# Patient Record
Sex: FEMALE | Race: WHITE | NOT HISPANIC OR LATINO | Employment: OTHER | URBAN - METROPOLITAN AREA
[De-identification: names, ages, dates, MRNs, and addresses within clinical notes are randomized per-mention and may not be internally consistent; named-entity substitution may affect disease eponyms.]

---

## 2017-03-13 ENCOUNTER — SEE NOTE (OUTPATIENT)
Dept: URBAN - METROPOLITAN AREA CLINIC 32 | Facility: CLINIC | Age: 42
Setting detail: DERMATOLOGY
End: 2017-03-13

## 2017-03-13 ENCOUNTER — RX ONLY (RX ONLY)
Age: 42
End: 2017-03-13

## 2017-03-13 PROCEDURE — 99213 OFFICE O/P EST LOW 20 MIN: CPT

## 2017-03-13 RX ORDER — FLUOCINOLONE ACETONIDE, HYDROQUINONE, AND TRETINOIN .1; 40; .5 MG/G; MG/G; MG/G
1 APPLICATION CREAM TOPICAL QHS
Qty: 30 | Refills: 3 | Status: DISCONTINUED
Start: 2017-03-13 | End: 2018-01-30

## 2017-03-20 ENCOUNTER — *BOTOX/DYSPORT (OUTPATIENT)
Dept: URBAN - METROPOLITAN AREA CLINIC 32 | Facility: CLINIC | Age: 42
Setting detail: DERMATOLOGY
End: 2017-03-20

## 2017-03-20 PROCEDURE — OTHER BOTOX COSMETIC - 1 AREA *BD*: OTHER

## 2017-03-20 PROCEDURE — OTHER BOTOX VIAL # (100U): OTHER

## 2017-04-10 ENCOUNTER — *BOTOX/DYSPORT (OUTPATIENT)
Dept: URBAN - METROPOLITAN AREA CLINIC 32 | Facility: CLINIC | Age: 42
Setting detail: DERMATOLOGY
End: 2017-04-10

## 2017-04-10 PROCEDURE — OTHER BOTOX COSMETIC - TOUCH-UP: OTHER

## 2017-07-10 ENCOUNTER — *BOTOX/DYSPORT (OUTPATIENT)
Dept: URBAN - METROPOLITAN AREA CLINIC 32 | Facility: CLINIC | Age: 42
Setting detail: DERMATOLOGY
End: 2017-07-10

## 2017-07-10 PROCEDURE — OTHER BOTOX VIAL # (100U): OTHER

## 2017-07-10 PROCEDURE — OTHER BOTOX COSMETIC - 1 AREA *BD*: OTHER

## 2017-07-25 ENCOUNTER — *BOTOX/DYSPORT (OUTPATIENT)
Dept: URBAN - METROPOLITAN AREA CLINIC 32 | Facility: CLINIC | Age: 42
Setting detail: DERMATOLOGY
End: 2017-07-25

## 2017-07-25 PROBLEM — L72.0 EPIDERMAL CYST: Status: RESOLVED | Noted: 2017-07-25

## 2017-07-25 PROBLEM — L72.0 EPIDERMAL CYST: Status: ACTIVE | Noted: 2017-07-25

## 2017-07-25 PROCEDURE — 99213 OFFICE O/P EST LOW 20 MIN: CPT

## 2017-10-17 ENCOUNTER — *BOTOX/DYSPORT (OUTPATIENT)
Dept: URBAN - METROPOLITAN AREA CLINIC 32 | Facility: CLINIC | Age: 42
Setting detail: DERMATOLOGY
End: 2017-10-17

## 2017-10-17 PROCEDURE — OTHER BOTOX COSMETIC - 1 AREA *BD*: OTHER

## 2017-10-17 PROCEDURE — OTHER BOTOX VIAL # (100U): OTHER

## 2018-01-09 ENCOUNTER — RASH (OUTPATIENT)
Dept: URBAN - METROPOLITAN AREA CLINIC 32 | Facility: CLINIC | Age: 43
Setting detail: DERMATOLOGY
End: 2018-01-09

## 2018-01-09 PROCEDURE — 99213 OFFICE O/P EST LOW 20 MIN: CPT

## 2018-01-30 ENCOUNTER — SKIN CANCER EXAM (OUTPATIENT)
Dept: URBAN - METROPOLITAN AREA CLINIC 32 | Facility: CLINIC | Age: 43
Setting detail: DERMATOLOGY
End: 2018-01-30

## 2018-01-30 PROCEDURE — 99214 OFFICE O/P EST MOD 30 MIN: CPT

## 2018-01-30 PROCEDURE — OTHER BOTOX VIAL # (100U): OTHER

## 2018-01-30 PROCEDURE — OTHER BOTOX COSMETIC - 1 AREA *BD*: OTHER

## 2018-04-23 ENCOUNTER — *BOTOX/DYSPORT (OUTPATIENT)
Dept: URBAN - METROPOLITAN AREA CLINIC 32 | Facility: CLINIC | Age: 43
Setting detail: DERMATOLOGY
End: 2018-04-23

## 2018-04-23 PROCEDURE — OTHER BOTOX VIAL # (100U): OTHER

## 2018-04-23 PROCEDURE — OTHER BOTOX COSMETIC - 1 AREA *BD*: OTHER

## 2018-05-08 ENCOUNTER — *BOTOX/DYSPORT TOUCH-UP (OUTPATIENT)
Dept: URBAN - METROPOLITAN AREA CLINIC 32 | Facility: CLINIC | Age: 43
Setting detail: DERMATOLOGY
End: 2018-05-08

## 2018-05-08 PROCEDURE — OTHER NO CHARGE OFFICE VISIT: OTHER

## 2018-08-13 ENCOUNTER — *BOTOX/DYSPORT (OUTPATIENT)
Dept: URBAN - METROPOLITAN AREA CLINIC 32 | Facility: CLINIC | Age: 43
Setting detail: DERMATOLOGY
End: 2018-08-13

## 2018-08-13 PROCEDURE — OTHER BOTOX VIAL # (100U): OTHER

## 2018-08-13 PROCEDURE — OTHER BOTOX COSMETIC - 1 AREA *BD*: OTHER

## 2018-10-08 ENCOUNTER — SEE NOTE (OUTPATIENT)
Dept: URBAN - METROPOLITAN AREA CLINIC 32 | Facility: CLINIC | Age: 43
Setting detail: DERMATOLOGY
End: 2018-10-08

## 2018-10-08 PROBLEM — L82.0 INFLAMED SEBORRHEIC KERATOSIS: Status: RESOLVED | Noted: 2018-10-08

## 2018-10-08 PROBLEM — L82.0 INFLAMED SEBORRHEIC KERATOSIS: Status: ACTIVE | Noted: 2018-10-08

## 2018-10-08 PROCEDURE — 17110 DESTRUCT B9 LESION 1-14: CPT

## 2018-10-08 PROCEDURE — 99213 OFFICE O/P EST LOW 20 MIN: CPT

## 2018-11-01 ENCOUNTER — RX ONLY (RX ONLY)
Age: 43
End: 2018-11-01

## 2018-11-01 RX ORDER — VALACYCLOVIR HYDROCHLORIDE 1 G/1
1 TABLET TABLET, FILM COATED ORAL AS DIRECTED
Qty: 28 | Refills: 0 | Status: DISCONTINUED
Start: 2018-11-01 | End: 2018-11-27

## 2018-11-19 ENCOUNTER — IPL - FACE (BH) (OUTPATIENT)
Dept: URBAN - METROPOLITAN AREA CLINIC 31 | Facility: CLINIC | Age: 43
Setting detail: DERMATOLOGY
End: 2018-11-19

## 2018-11-19 PROCEDURE — OTHER IPL LASER: OTHER

## 2018-11-26 ENCOUNTER — *BOTOX/DYSPORT (OUTPATIENT)
Dept: URBAN - METROPOLITAN AREA CLINIC 32 | Facility: CLINIC | Age: 43
Setting detail: DERMATOLOGY
End: 2018-11-26

## 2018-11-26 PROCEDURE — OTHER DYSPORT VIAL #: OTHER

## 2018-11-26 PROCEDURE — OTHER DYSPORT - 1 AREA: OTHER

## 2018-11-27 ENCOUNTER — RX ONLY (RX ONLY)
Age: 43
End: 2018-11-27

## 2018-11-27 RX ORDER — VALACYCLOVIR HYDROCHLORIDE 1 G/1
1 TABLET TABLET, FILM COATED ORAL AS DIRECTED
Qty: 28 | Refills: 0
Start: 2018-11-27

## 2018-12-20 ENCOUNTER — IPL - FACE (BH) (OUTPATIENT)
Dept: URBAN - METROPOLITAN AREA CLINIC 31 | Facility: CLINIC | Age: 43
Setting detail: DERMATOLOGY
End: 2018-12-20

## 2018-12-20 PROCEDURE — OTHER IPL LASER: OTHER

## 2019-01-22 ENCOUNTER — IPL - FACE (BH) (OUTPATIENT)
Dept: URBAN - METROPOLITAN AREA CLINIC 31 | Facility: CLINIC | Age: 44
Setting detail: DERMATOLOGY
End: 2019-01-22

## 2019-01-22 PROCEDURE — OTHER IPL LASER: OTHER

## 2019-02-11 ENCOUNTER — NUMB FOR INJECTABLES (OUTPATIENT)
Dept: URBAN - METROPOLITAN AREA CLINIC 32 | Facility: CLINIC | Age: 44
Setting detail: DERMATOLOGY
End: 2019-02-11

## 2019-02-11 DIAGNOSIS — L85.3 XEROSIS CUTIS: ICD-10-CM

## 2019-02-11 PROCEDURE — OTHER DYSPORT - 1 AREA: OTHER

## 2019-02-11 PROCEDURE — OTHER DYSPORT VIAL #: OTHER

## 2019-02-25 ENCOUNTER — PEEL- EST (OUTPATIENT)
Dept: URBAN - METROPOLITAN AREA CLINIC 31 | Facility: CLINIC | Age: 44
Setting detail: DERMATOLOGY
End: 2019-02-25

## 2019-02-25 DIAGNOSIS — Z41.9 ENCOUNTER FOR PROCEDURE FOR PURPOSES OTHER THAN REMEDYING HEALTH STATE, UNSPECIFIED: ICD-10-CM

## 2019-02-25 PROCEDURE — OTHER PEEL PCA CUSTOM PEEL: OTHER

## 2019-03-06 ENCOUNTER — CYST INJECTION (OUTPATIENT)
Dept: URBAN - METROPOLITAN AREA CLINIC 32 | Facility: CLINIC | Age: 44
Setting detail: DERMATOLOGY
End: 2019-03-06

## 2019-03-06 DIAGNOSIS — Z41.9 ENCOUNTER FOR PROCEDURE FOR PURPOSES OTHER THAN REMEDYING HEALTH STATE, UNSPECIFIED: ICD-10-CM

## 2019-03-06 PROCEDURE — 11900 INJECT SKIN LESIONS </W 7: CPT

## 2019-03-18 ENCOUNTER — INJECTION OTHER (OUTPATIENT)
Dept: URBAN - METROPOLITAN AREA CLINIC 34 | Facility: CLINIC | Age: 44
Setting detail: DERMATOLOGY
End: 2019-03-18

## 2019-03-18 PROCEDURE — 11900 INJECT SKIN LESIONS </W 7: CPT

## 2019-05-06 ENCOUNTER — SKIN CANCER EXAM (OUTPATIENT)
Dept: URBAN - METROPOLITAN AREA CLINIC 32 | Facility: CLINIC | Age: 44
Setting detail: DERMATOLOGY
End: 2019-05-06

## 2019-05-06 ENCOUNTER — RX ONLY (RX ONLY)
Age: 44
End: 2019-05-06

## 2019-05-06 DIAGNOSIS — L82.0 INFLAMED SEBORRHEIC KERATOSIS: ICD-10-CM

## 2019-05-06 PROCEDURE — 99214 OFFICE O/P EST MOD 30 MIN: CPT

## 2019-05-06 PROCEDURE — OTHER DYSPORT VIAL #: OTHER

## 2019-05-06 PROCEDURE — OTHER DYSPORT - 1 AREA: OTHER

## 2019-05-06 RX ORDER — FLUOCINOLONE ACETONIDE, HYDROQUINONE, AND TRETINOIN .1; 40; .5 MG/G; MG/G; MG/G
1 APPLICATION CREAM TOPICAL QHS
Qty: 30 | Refills: 1
Start: 2019-05-06

## 2019-05-10 ENCOUNTER — SEE NOTE (OUTPATIENT)
Dept: URBAN - METROPOLITAN AREA CLINIC 32 | Facility: CLINIC | Age: 44
Setting detail: DERMATOLOGY
End: 2019-05-10

## 2019-05-10 DIAGNOSIS — Z41.9 ENCOUNTER FOR PROCEDURE FOR PURPOSES OTHER THAN REMEDYING HEALTH STATE, UNSPECIFIED: ICD-10-CM

## 2019-05-10 PROBLEM — L0391 682.9: Status: ACTIVE | Noted: 2019-05-10

## 2019-05-10 PROBLEM — L0390 682.9: Status: ACTIVE | Noted: 2019-05-10

## 2019-05-10 PROCEDURE — 10060 I&D ABSCESS SIMPLE/SINGLE: CPT

## 2019-07-31 ENCOUNTER — NUMB FOR INJECTABLES (OUTPATIENT)
Dept: URBAN - METROPOLITAN AREA CLINIC 32 | Facility: CLINIC | Age: 44
Setting detail: DERMATOLOGY
End: 2019-07-31

## 2019-07-31 DIAGNOSIS — D22.5 MELANOCYTIC NEVI OF TRUNK: ICD-10-CM

## 2019-07-31 PROCEDURE — OTHER BOTOX COSMETIC - 1 AREA *BD*: OTHER

## 2019-07-31 PROCEDURE — OTHER BOTOX VIAL # (100U): OTHER

## 2019-11-11 ENCOUNTER — *BOTOX/DYSPORT (OUTPATIENT)
Dept: URBAN - METROPOLITAN AREA CLINIC 32 | Facility: CLINIC | Age: 44
Setting detail: DERMATOLOGY
End: 2019-11-11

## 2019-11-11 DIAGNOSIS — B07.8 OTHER VIRAL WARTS: ICD-10-CM

## 2019-11-11 PROCEDURE — OTHER DYSPORT - 1 AREA: OTHER

## 2019-11-11 PROCEDURE — OTHER DYSPORT VIAL #: OTHER

## 2020-02-04 ENCOUNTER — *BOTOX/DYSPORT (OUTPATIENT)
Dept: URBAN - METROPOLITAN AREA CLINIC 32 | Facility: CLINIC | Age: 45
Setting detail: DERMATOLOGY
End: 2020-02-04

## 2020-02-04 DIAGNOSIS — L70.0 ACNE VULGARIS: ICD-10-CM

## 2020-02-04 PROCEDURE — OTHER DYSPORT - 1 AREA: OTHER

## 2020-02-04 PROCEDURE — OTHER DYSPORT VIAL #: OTHER

## 2020-05-18 ENCOUNTER — *BOTOX/DYSPORT (OUTPATIENT)
Dept: URBAN - METROPOLITAN AREA CLINIC 32 | Facility: CLINIC | Age: 45
Setting detail: DERMATOLOGY
End: 2020-05-18

## 2020-05-18 DIAGNOSIS — D48.5 NEOPLASM OF UNCERTAIN BEHAVIOR OF SKIN: ICD-10-CM

## 2020-05-18 PROCEDURE — OTHER DYSPORT - 1 AREA: OTHER

## 2020-05-18 PROCEDURE — OTHER DYSPORT VIAL #: OTHER

## 2020-08-31 ENCOUNTER — NUMB FOR INJECTABLES (OUTPATIENT)
Dept: URBAN - METROPOLITAN AREA CLINIC 32 | Facility: CLINIC | Age: 45
Setting detail: DERMATOLOGY
End: 2020-08-31

## 2020-08-31 DIAGNOSIS — L81.0 POSTINFLAMMATORY HYPERPIGMENTATION: ICD-10-CM

## 2020-08-31 DIAGNOSIS — L70.0 ACNE VULGARIS: ICD-10-CM

## 2020-08-31 PROCEDURE — OTHER DYSPORT - 1 AREA: OTHER

## 2020-08-31 PROCEDURE — OTHER DYSPORT VIAL #: OTHER

## 2020-11-30 ENCOUNTER — *BOTOX/DYSPORT (OUTPATIENT)
Dept: URBAN - METROPOLITAN AREA CLINIC 32 | Facility: CLINIC | Age: 45
Setting detail: DERMATOLOGY
End: 2020-11-30

## 2020-11-30 DIAGNOSIS — L23.9 ALLERGIC CONTACT DERMATITIS, UNSPECIFIED CAUSE: ICD-10-CM

## 2020-11-30 DIAGNOSIS — L70.0 ACNE VULGARIS: ICD-10-CM

## 2020-11-30 PROCEDURE — OTHER DYSPORT - 1 AREA: OTHER

## 2020-11-30 PROCEDURE — OTHER DYSPORT VIAL #: OTHER

## 2021-04-20 ENCOUNTER — *BOTOX/DYSPORT (OUTPATIENT)
Dept: URBAN - METROPOLITAN AREA CLINIC 32 | Facility: CLINIC | Age: 46
Setting detail: DERMATOLOGY
End: 2021-04-20

## 2021-04-20 DIAGNOSIS — L56.8 OTHER SPECIFIED ACUTE SKIN CHANGES DUE TO ULTRAVIOLET RADIATION: ICD-10-CM

## 2021-04-20 DIAGNOSIS — D22.5 MELANOCYTIC NEVI OF TRUNK: ICD-10-CM

## 2021-04-20 DIAGNOSIS — L21.8 OTHER SEBORRHEIC DERMATITIS: ICD-10-CM

## 2021-04-20 DIAGNOSIS — L71.0 PERIORAL DERMATITIS: ICD-10-CM

## 2021-04-20 PROCEDURE — OTHER DYSPORT - 1 AREA: OTHER

## 2021-04-20 PROCEDURE — OTHER DYSPORT VIAL #: OTHER

## 2021-05-04 ENCOUNTER — SKIN CANCER EXAM (OUTPATIENT)
Dept: URBAN - METROPOLITAN AREA CLINIC 32 | Facility: CLINIC | Age: 46
Setting detail: DERMATOLOGY
End: 2021-05-04

## 2021-05-04 ENCOUNTER — RX ONLY (RX ONLY)
Age: 46
End: 2021-05-04

## 2021-05-04 DIAGNOSIS — L57.0 ACTINIC KERATOSIS: ICD-10-CM

## 2021-05-04 PROCEDURE — 99214 OFFICE O/P EST MOD 30 MIN: CPT

## 2021-05-04 RX ORDER — VALACYCLOVIR HYDROCHLORIDE 1 G/1
2 TABLET TABLET, FILM COATED ORAL AS DIRECTED
Qty: 28 | Refills: 1
Start: 2021-05-04

## 2021-09-21 ENCOUNTER — *BOTOX/DYSPORT (OUTPATIENT)
Dept: URBAN - METROPOLITAN AREA CLINIC 32 | Facility: CLINIC | Age: 46
Setting detail: DERMATOLOGY
End: 2021-09-21

## 2021-09-21 DIAGNOSIS — L29.8 OTHER PRURITUS: ICD-10-CM

## 2021-09-21 PROCEDURE — OTHER DYSPORT - 1 AREA: OTHER

## 2021-09-21 PROCEDURE — OTHER DYSPORT VIAL #: OTHER

## 2021-12-20 ENCOUNTER — *BOTOX/DYSPORT (OUTPATIENT)
Dept: URBAN - METROPOLITAN AREA CLINIC 32 | Facility: CLINIC | Age: 46
Setting detail: DERMATOLOGY
End: 2021-12-20

## 2021-12-20 DIAGNOSIS — D48.5 NEOPLASM OF UNCERTAIN BEHAVIOR OF SKIN: ICD-10-CM

## 2021-12-20 PROCEDURE — OTHER DYSPORT - 1 AREA: OTHER

## 2021-12-20 PROCEDURE — OTHER DYSPORT VIAL #: OTHER

## 2023-12-27 ENCOUNTER — APPOINTMENT (OUTPATIENT)
Dept: URBAN - METROPOLITAN AREA CLINIC 208 | Age: 48
Setting detail: DERMATOLOGY
End: 2024-01-01

## 2023-12-27 DIAGNOSIS — Z41.9 ENCOUNTER FOR PROCEDURE FOR PURPOSES OTHER THAN REMEDYING HEALTH STATE, UNSPECIFIED: ICD-10-CM

## 2023-12-27 PROCEDURE — OTHER DYSPORT: OTHER

## 2023-12-27 PROCEDURE — OTHER MIPS QUALITY: OTHER

## 2023-12-27 PROCEDURE — OTHER INVENTORY: OTHER

## 2023-12-27 ASSESSMENT — LOCATION SIMPLE DESCRIPTION DERM
LOCATION SIMPLE: RIGHT FOREHEAD
LOCATION SIMPLE: LEFT FOREHEAD
LOCATION SIMPLE: GLABELLA

## 2023-12-27 ASSESSMENT — LOCATION DETAILED DESCRIPTION DERM
LOCATION DETAILED: LEFT SUPERIOR MEDIAL FOREHEAD
LOCATION DETAILED: RIGHT INFERIOR MEDIAL FOREHEAD
LOCATION DETAILED: LEFT SUPERIOR FOREHEAD
LOCATION DETAILED: RIGHT FOREHEAD
LOCATION DETAILED: LEFT FOREHEAD
LOCATION DETAILED: RIGHT MEDIAL FOREHEAD
LOCATION DETAILED: RIGHT LATERAL FOREHEAD
LOCATION DETAILED: GLABELLA
LOCATION DETAILED: LEFT LATERAL FOREHEAD
LOCATION DETAILED: LEFT INFERIOR MEDIAL FOREHEAD

## 2023-12-27 ASSESSMENT — LOCATION ZONE DERM: LOCATION ZONE: FACE

## 2023-12-27 NOTE — HPI: COSMETIC (DYSPORT)
previous_has_had_dysport
When Was Your Last Dysport Treatment?: 09/01/2021
Additional History: Pt has not had Dysport in a while. Usually does it with Dr. Sparks. Got charged FH & GL as one area

## 2023-12-27 NOTE — PROCEDURE: DYSPORT
Total Units: 0
Price (Use Numbers Only, No Special Characters Or $): 400
Consent: Written consent obtained. Risks include but not limited to lid/brow ptosis, bruising, swelling, diplopia, temporary effect, incomplete chemical denervation.
Detail Level: Detailed
Post-Care Instructions: Patient instructed to not lie down for 4 hours and limit physical activity for 24 hours.
Show Inventory Tab: Hide
Expiration Date (Month Year): 07/2025
Map Statement: Please see attached map for locations and injection amounts.
Lot #: [id number]

## 2024-01-10 ENCOUNTER — APPOINTMENT (OUTPATIENT)
Dept: URBAN - METROPOLITAN AREA CLINIC 208 | Age: 49
Setting detail: DERMATOLOGY
End: 2024-01-14

## 2024-01-10 DIAGNOSIS — Z41.9 ENCOUNTER FOR PROCEDURE FOR PURPOSES OTHER THAN REMEDYING HEALTH STATE, UNSPECIFIED: ICD-10-CM

## 2024-01-10 PROCEDURE — OTHER INVENTORY: OTHER

## 2024-01-10 PROCEDURE — OTHER MIPS QUALITY: OTHER

## 2024-01-10 PROCEDURE — OTHER DYSPORT: OTHER

## 2024-01-10 ASSESSMENT — LOCATION ZONE DERM: LOCATION ZONE: FACE

## 2024-01-10 ASSESSMENT — LOCATION DETAILED DESCRIPTION DERM
LOCATION DETAILED: LEFT FOREHEAD
LOCATION DETAILED: RIGHT FOREHEAD
LOCATION DETAILED: GLABELLA

## 2024-01-10 ASSESSMENT — LOCATION SIMPLE DESCRIPTION DERM
LOCATION SIMPLE: LEFT FOREHEAD
LOCATION SIMPLE: GLABELLA
LOCATION SIMPLE: RIGHT FOREHEAD

## 2024-01-10 NOTE — PROCEDURE: DYSPORT
Map Statement: Please see attached map for locations and injection amounts.
Post-Care Instructions: Patient instructed to not lie down for 4 hours and limit physical activity for 24 hours.
Expiration Date (Month Year): 07/31/2025
Consent: Written consent obtained. Risks include but not limited to lid/brow ptosis, bruising, swelling, diplopia, temporary effect, incomplete chemical denervation.
Show Inventory Tab: Hide
Lot #: [id number]
Price (Use Numbers Only, No Special Characters Or $): 0
Detail Level: Detailed

## 2025-01-27 ENCOUNTER — APPOINTMENT (OUTPATIENT)
Dept: URBAN - METROPOLITAN AREA CLINIC 208 | Age: 50
Setting detail: DERMATOLOGY
End: 2025-01-30

## 2025-01-27 DIAGNOSIS — L82.1 OTHER SEBORRHEIC KERATOSIS: ICD-10-CM

## 2025-01-27 DIAGNOSIS — L57.8 OTHER SKIN CHANGES DUE TO CHRONIC EXPOSURE TO NONIONIZING RADIATION: ICD-10-CM

## 2025-01-27 DIAGNOSIS — L82.0 INFLAMED SEBORRHEIC KERATOSIS: ICD-10-CM

## 2025-01-27 DIAGNOSIS — D18.0 HEMANGIOMA: ICD-10-CM

## 2025-01-27 DIAGNOSIS — L81.4 OTHER MELANIN HYPERPIGMENTATION: ICD-10-CM

## 2025-01-27 DIAGNOSIS — D22 MELANOCYTIC NEVI: ICD-10-CM

## 2025-01-27 DIAGNOSIS — B00.1 HERPESVIRAL VESICULAR DERMATITIS: ICD-10-CM

## 2025-01-27 PROBLEM — D18.01 HEMANGIOMA OF SKIN AND SUBCUTANEOUS TISSUE: Status: ACTIVE | Noted: 2025-01-27

## 2025-01-27 PROBLEM — D22.5 MELANOCYTIC NEVI OF TRUNK: Status: ACTIVE | Noted: 2025-01-27

## 2025-01-27 PROCEDURE — OTHER PRESCRIPTION: OTHER

## 2025-01-27 PROCEDURE — OTHER COUNSELING: OTHER

## 2025-01-27 PROCEDURE — 17110 DESTRUCT B9 LESION 1-14: CPT

## 2025-01-27 PROCEDURE — OTHER SUNSCREEN RECOMMENDATIONS: OTHER

## 2025-01-27 PROCEDURE — 99213 OFFICE O/P EST LOW 20 MIN: CPT | Mod: 25

## 2025-01-27 PROCEDURE — OTHER MIPS QUALITY: OTHER

## 2025-01-27 PROCEDURE — OTHER LIQUID NITROGEN: OTHER

## 2025-01-27 PROCEDURE — OTHER ADDITIONAL NOTES: OTHER

## 2025-01-27 RX ORDER — VALACYCLOVIR HYDROCHLORIDE 1 G/1
TABLET, FILM COATED ORAL Q12 HOURS
Qty: 12 | Refills: 0 | Status: ERX | COMMUNITY
Start: 2025-01-27

## 2025-01-27 ASSESSMENT — LOCATION SIMPLE DESCRIPTION DERM
LOCATION SIMPLE: TRAPEZIAL NECK
LOCATION SIMPLE: RIGHT LOWER BACK
LOCATION SIMPLE: LEFT SHOULDER
LOCATION SIMPLE: CHEST
LOCATION SIMPLE: RIGHT SHOULDER
LOCATION SIMPLE: RIGHT FOREHEAD

## 2025-01-27 ASSESSMENT — LOCATION DETAILED DESCRIPTION DERM
LOCATION DETAILED: LEFT ANTERIOR SHOULDER
LOCATION DETAILED: MID TRAPEZIAL NECK
LOCATION DETAILED: RIGHT MEDIAL FOREHEAD
LOCATION DETAILED: MIDDLE STERNUM
LOCATION DETAILED: UPPER STERNUM
LOCATION DETAILED: LEFT LATERAL SUPERIOR CHEST
LOCATION DETAILED: RIGHT ANTERIOR SHOULDER
LOCATION DETAILED: RIGHT SUPERIOR LATERAL MIDBACK

## 2025-01-27 ASSESSMENT — LOCATION ZONE DERM
LOCATION ZONE: NECK
LOCATION ZONE: FACE
LOCATION ZONE: TRUNK
LOCATION ZONE: ARM

## 2025-01-27 NOTE — PROCEDURE: SUNSCREEN RECOMMENDATIONS
Detail Level: Simple
Products Recommended: Sun protective clothing of all colors can be found at sites like Coolibar, Solumbra, Solbari, UVSkinz, Naviskin, Uniglo, [place], Luminara, BloqUV\\nOutdoor brands like Lands End, Athleta, LL. Bean, Manuel Mclean, [place], STEPHEN, Free Fly, Offerle, Under [place] Jon Sun San Jon\\nFashion brands like Sun50, Susan Pulitzer, Mott50, Baleaf, Epoque Evolution, J. Crew, Eunice Zaid\\nSun hats at [place] hats, Mott50, Sun'n'sand, Jane Canelo, [place], Sunhatsbyroni, Coolibar and above outdoor brands\\n\\nSunscreen Recommendations for All Skin Colors (* = mineral based)\\nPurchase Here:\\nEltaMD Clear SPF 46; Also comes Clear Tinted (for oily/acne/rosacea/seb derm/sensitive skin)\\nEltaMD Daily (for dry skin)\\n*Colorescience Face Shield 50 (a fave)\\n*ISDIN Eryfotona Actinica (all skin types, helps with precancers)\\n*Colorscience, Supergoop and ISDIN powder brushes (great for reapplication)\\n*Skinbetter products (stick for active/sweating)\\n*Revision Intellishade (tinted; Matte and Original options; Antiaging)\\n\\nFrom Local Store:\\nCeraVe Ultra Light Moisturizing lotion SPF 30 (acne prone; very sheer)\\nCeraVe AM (what I use; good for normal or combination acne-prone)\\n*CeraVe Hydrating Mineral SPF (tinted)\\nCetaphil Pro Oil Control SPF 30 (acne prone)\\nNeutrogena Clear Face SPF 55 (acne prone)\\n*Neutrogena with Purescreen technology (good for sensitive skin; also has tinted version)\\nSupergoop Glow Screen (tinted)\\n*Supergoop Mineral Sheer Screen (also serves as a primer before makeup)\\n\\nKorean and Japanese Sunscreens From [employer] and Yesstyle.com- Good for Acne prone, sunburn-prone skin. Have wonderful light-weight textures that disappear into the skin!\\nBeauty of [facility] - Relief Sun\\nBeauty of [place] - Matte Sun Stick\\nSKIN 1004 - [place] Centella Hyalu-Cica Water-Fit Sun Serum\\nIsntree - Hyaluronic Acid Watery Sun Gel\\nROUND LAB - Birch Juice Moisturizing Sunscreen\\nBeauty of [place] - Relief Sun\\n[place]ao - Biore UV Aqua Rich Watery Essence Sunscreen SPF 50+ PA++++ \\n \\nFor Very Sensitive skin:\\n*Vanicream (another fave)\\n*Sunbum mineral\\n*Blue lizard sensitive\\nAveeno with feverfew moisturizer (rosacea)\\n\\nPowder sunscreens, good for reapplication and for scalp areas:\\n*Supergoop Re(setting) Mineral powder\\n*Supergoop Poof Part Powder\\n*Isdinceutics Mineral Brush\\n*Colorscience Sunforgettable Mineral Powder\\n\\nOther favorites for skin of color: *TIZO3 mineral tinted *Coola mineral or tinted Solbar *Topix Sheer Physical *Alastin Hydratint *It Cosmetics CC+ cream (also is antiaging)\\n\\nFor concerns with hyperpigmentation (skin darkening), look for tinted sunscreens with iron oxide protection from blue light - Needed for treating Melasma! Need to reapply every 2 hours, so I recommend one of the powder sunscreens for that. Add topical vitamin C and computer bluescreen protector!! Remember, visible light matters too.\\n\\nFor MEN, I'll usually recommend one of the Korean or Japanese Sunscreens (see above), Elta MD Clear or Isdin Eryfotona Actinica. Other good ones are CeraVe Ultra Light, Supergoop Unseen, La Roche Posay Melt in Milk
General Sunscreen Counseling: I recommended a broad spectrum sunscreen with a SPF of 30 or higher.  I explained that SPF 30 sunscreens block approximately 97 percent of the sun's harmful rays.  Sunscreens should be applied at least 15 minutes prior to expected sun exposure and then every 2 hours after that as long as sun exposure continues. If swimming or exercising sunscreen should be reapplied every 45 minutes to an hour after getting wet or sweating.  One ounce, or the equivalent of a shot glass full of sunscreen, is adequate to protect the skin not covered by a bathing suit. I also recommended a lip balm with a sunscreen as well. Sun protective clothing can be used in lieu of sunscreen but must be worn the entire time you are exposed to the sun's rays.

## 2025-01-27 NOTE — PROCEDURE: ADDITIONAL NOTES
Render Risk Assessment In Note?: no
Additional Notes: Lesion most consistent w/ ISk. Discussed biopsy today vs. cryotherapy today with 3 week f/u to ensure resolved. Pt wishes to have it treated with liquid nitrogen today + f/u in 3 weeks
Detail Level: Simple

## 2025-01-27 NOTE — PROCEDURE: LIQUID NITROGEN
Show Topical Anesthesia Variable?: Yes
Application Tool (Optional): Liquid Nitrogen Sprayer
Number Of Freeze-Thaw Cycles: 3 freeze-thaw cycles
Duration Of Freeze Thaw-Cycle (Seconds): 5
Post-Care Instructions: I reviewed with the patient in detail post-care instructions. Patient is to wear sunprotection, and avoid picking at any of the treated lesions. Pt may apply Vaseline to crusted or scabbing areas.
Medical Necessity Information: It is in your best interest to select a reason for this procedure from the list below. All of these items fulfill various CMS LCD requirements except the new and changing color options.
Medical Necessity Clause: This procedure was medically necessary because the lesions that were treated were:
Pared With?: 15 blade
Spray Paint Technique: No
Spray Paint Text: The liquid nitrogen was applied to the skin utilizing a spray paint frosting technique.
Consent: The patient's consent was obtained including but not limited to risks of crusting, scabbing, blistering, scarring, darker or lighter pigmentary change, recurrence, incomplete removal and infection.
Detail Level: Detailed

## 2025-04-11 ENCOUNTER — OFFICE VISIT (OUTPATIENT)
Dept: URBAN - METROPOLITAN AREA SURGERY CENTER 19 | Facility: SURGERY CENTER | Age: 50
End: 2025-04-11

## 2025-06-13 ENCOUNTER — TELEPHONE ENCOUNTER (OUTPATIENT)
Dept: URBAN - METROPOLITAN AREA CLINIC 2 | Facility: CLINIC | Age: 50
End: 2025-06-13

## 2025-06-20 ENCOUNTER — OFFICE VISIT (OUTPATIENT)
Dept: URBAN - METROPOLITAN AREA SURGERY CENTER 19 | Facility: SURGERY CENTER | Age: 50
End: 2025-06-20